# Patient Record
(demographics unavailable — no encounter records)

---

## 2021-01-26 NOTE — EMERGENCY DEPARTMENT REPORT
ED General Adult HPI





- General


Chief complaint: Altered Mental Status


Stated complaint: SYNCOPE/POSS TI


Time Seen by Provider: 01/26/21 14:38


Source: patient, EMS


Mode of arrival: Stretcher


Limitations: Altered Mental Status, Physical Limitation





- History of Present Illness


Initial comments: 





Patient presents to emergency department via EMS with a chief complaint of 

episode of forgetfulness that happened today at home.  Patient states that his 

wife became concerned so she called EMS.  Patient is able to tell me his present

place and time in the last 5 presidents.  Patient denies any chest pain, 

shortness breath, or headache.  Patient states he had a stroke in June 2020 and 

states he was actually on the 9th of June.  Patient states he was told after the

stroke you have episodes of forgetfulness and thinks that is what occurred 

today.  Patient denies any numbness, tingling, paresthesias.


-: Sudden


Severity scale (0 -10): 0


Consistency: now resolved


Improves with: none


Worsens with: none


Associated Symptoms: denies other symptoms


Treatments Prior to Arrival: none





ED Review of Systems


ROS: 


Stated complaint: SYNCOPE/POSS TI


Other details as noted in HPI





Comment: All other systems reviewed and negative


Constitutional: denies: chills, fever


Eyes: denies: eye pain, eye discharge, vision change


ENT: denies: ear pain, throat pain


Respiratory: denies: cough, shortness of breath, wheezing


Cardiovascular: denies: chest pain, palpitations


Endocrine: no symptoms reported


Gastrointestinal: denies: abdominal pain, nausea, diarrhea


Genitourinary: denies: urgency, dysuria


Musculoskeletal: denies: back pain, joint swelling, arthralgia


Skin: denies: rash, lesions


Neurological: denies: headache, weakness, paresthesias


Psychiatric: denies: anxiety, depression


Hematological/Lymphatic: denies: easy bleeding, easy bruising





ED Past Medical Hx





- Past Medical History


Previous Medical History?: Yes


Hx Hypertension: Yes


Hx CVA: Yes





- Social History


Smoking Status: Unknown if ever smoked





ED Physical Exam





- General


Limitations: Altered Mental Status, Physical Limitation


General appearance: alert, in no apparent distress





- Head


Head exam: Present: atraumatic, normocephalic





- Eye


Eye exam: Present: normal appearance, PERRL, EOMI





- ENT


ENT exam: Present: mucous membranes moist





- Neck


Neck exam: Present: normal inspection





- Respiratory


Respiratory exam: Present: normal lung sounds bilaterally.  Absent: respiratory 

distress





- Cardiovascular


Cardiovascular Exam: Present: regular rate, normal rhythm.  Absent: systolic 

murmur, diastolic murmur, rubs, gallop





- GI/Abdominal


GI/Abdominal exam: Present: soft, normal bowel sounds.  Absent: distended, 

tenderness





- Rectal


Rectal exam: Present: deferred





- Extremities Exam


Extremities exam: Present: normal inspection





- Back Exam


Back exam: Present: normal inspection





- Neurological Exam


Neurological exam: Present: alert, oriented X3, CN II-XII intact, other (Patient

has residual right upper stream but he deficit from prior stroke.  Patient also 

has right lower extremity difficulty with movement secondary to previous injury 

with a pin placed).  Absent: motor sensory deficit





- Psychiatric


Psychiatric exam: Present: normal affect, normal mood





- Skin


Skin exam: Present: warm, dry, intact, normal color.  Absent: rash





ED Course





                                   Vital Signs











  01/26/21





  14:14


 


Pulse Rate 76


 


Respiratory 18





Rate 


 


Blood Pressure 186/78


 


Blood Pressure 186/76





[Left] 


 


O2 Sat by Pulse 99





Oximetry 














ED Medical Decision Making





- Lab Data


Result diagrams: 


                                 01/26/21 15:30





                                 01/26/21 15:30








                                   Lab Results











  01/26/21 01/26/21 01/26/21 Range/Units





  15:30 15:30 15:30 


 


WBC  7.3    (4.5-11.0)  K/mm3


 


RBC  4.94    (3.65-5.03)  M/mm3


 


Hgb  11.9    (11.8-15.2)  gm/dl


 


Hct  39.0    (35.5-45.6)  %


 


MCV  79 L    (84-94)  fl


 


MCH  24 L    (28-32)  pg


 


MCHC  31 L    (32-34)  %


 


RDW  17.2 H    (13.2-15.2)  %


 


Plt Count  241    (140-440)  K/mm3


 


Lymph % (Auto)  26.9    (13.4-35.0)  %


 


Mono % (Auto)  9.6 H    (0.0-7.3)  %


 


Eos % (Auto)  1.5    (0.0-4.3)  %


 


Baso % (Auto)  1.0    (0.0-1.8)  %


 


Lymph # (Auto)  2.0    (1.2-5.4)  K/mm3


 


Mono # (Auto)  0.7    (0.0-0.8)  K/mm3


 


Eos # (Auto)  0.1    (0.0-0.4)  K/mm3


 


Baso # (Auto)  0.1    (0.0-0.1)  K/mm3


 


Seg Neutrophils %  61.0    (40.0-70.0)  %


 


Seg Neutrophils #  4.4    (1.8-7.7)  K/mm3


 


PT   13.6   (12.2-14.9)  Sec.


 


INR   1.05   (0.87-1.13)  


 


APTT   31.9   (24.2-36.6)  Sec.


 


Sodium    142  (137-145)  mmol/L


 


Potassium    4.7  (3.6-5.0)  mmol/L


 


Chloride    107.0  ()  mmol/L


 


Carbon Dioxide    28  (22-30)  mmol/L


 


Anion Gap    12  mmol/L


 


BUN    11  (9-20)  mg/dL


 


Creatinine    0.7 L  (0.8-1.3)  mg/dL


 


Estimated GFR    > 60  ml/min


 


BUN/Creatinine Ratio    16  %


 


Glucose    96  ()  mg/dL


 


Lactic Acid     (0.7-2.0)  mmol/L


 


Calcium    9.2  (8.4-10.2)  mg/dL


 


Total Bilirubin    0.30  (0.1-1.2)  mg/dL


 


AST    20  (5-40)  units/L


 


ALT    13  (7-56)  units/L


 


Alkaline Phosphatase    161 H  ()  units/L


 


Ammonia     (25-60)  umol/L


 


Total Protein    7.2  (6.3-8.2)  g/dL


 


Albumin    3.9  (3.9-5)  g/dL


 


Albumin/Globulin Ratio    1.2  %














  01/26/21 01/26/21 Range/Units





  15:30 15:30 


 


WBC    (4.5-11.0)  K/mm3


 


RBC    (3.65-5.03)  M/mm3


 


Hgb    (11.8-15.2)  gm/dl


 


Hct    (35.5-45.6)  %


 


MCV    (84-94)  fl


 


MCH    (28-32)  pg


 


MCHC    (32-34)  %


 


RDW    (13.2-15.2)  %


 


Plt Count    (140-440)  K/mm3


 


Lymph % (Auto)    (13.4-35.0)  %


 


Mono % (Auto)    (0.0-7.3)  %


 


Eos % (Auto)    (0.0-4.3)  %


 


Baso % (Auto)    (0.0-1.8)  %


 


Lymph # (Auto)    (1.2-5.4)  K/mm3


 


Mono # (Auto)    (0.0-0.8)  K/mm3


 


Eos # (Auto)    (0.0-0.4)  K/mm3


 


Baso # (Auto)    (0.0-0.1)  K/mm3


 


Seg Neutrophils %    (40.0-70.0)  %


 


Seg Neutrophils #    (1.8-7.7)  K/mm3


 


PT    (12.2-14.9)  Sec.


 


INR    (0.87-1.13)  


 


APTT    (24.2-36.6)  Sec.


 


Sodium    (137-145)  mmol/L


 


Potassium    (3.6-5.0)  mmol/L


 


Chloride    ()  mmol/L


 


Carbon Dioxide    (22-30)  mmol/L


 


Anion Gap    mmol/L


 


BUN    (9-20)  mg/dL


 


Creatinine    (0.8-1.3)  mg/dL


 


Estimated GFR    ml/min


 


BUN/Creatinine Ratio    %


 


Glucose    ()  mg/dL


 


Lactic Acid  1.40   (0.7-2.0)  mmol/L


 


Calcium    (8.4-10.2)  mg/dL


 


Total Bilirubin    (0.1-1.2)  mg/dL


 


AST    (5-40)  units/L


 


ALT    (7-56)  units/L


 


Alkaline Phosphatase    ()  units/L


 


Ammonia   49.0  (25-60)  umol/L


 


Total Protein    (6.3-8.2)  g/dL


 


Albumin    (3.9-5)  g/dL


 


Albumin/Globulin Ratio    %














- Radiology Data


Radiology results: report reviewed





- Medical Decision Making





Discussed results with patient


Critical care attestation.: 


If time is entered above; I have spent that time in minutes in the direct care 

of this critically ill patient, excluding procedure time.








ED Disposition


Clinical Impression: 


 Forgetfulness





Disposition: DC-01 TO HOME OR SELFCARE


Is pt being admited?: No


Does the pt Need Aspirin: No


Condition: Stable


Instructions:  Confusion


Additional Instructions: 


Return if worse


Referrals: 


NICOLE SANABRIA MD [Primary Care Provider] - 3-5 Days


Time of Disposition: 18:15

## 2021-01-26 NOTE — XRAY REPORT
CHEST 1 VIEW



INDICATION: 

Altered Mental Status.



COMPARISON: 

None.



FINDINGS:



Support devices: None.

Heart: Mildly enlarged. 

Lungs/Pleura: Mild reticular opacities are present, greater on the right. No significant effusion, no
 pneumothorax.  







IMPRESSION:

1. Bilateral pulmonary opacities are somewhat reticular and may be chronic. It would be difficult to 
exclude lower airways disease. No focal consolidation.



Signer Name: Shashi Wolfe MD 

Signed: 1/26/2021 3:38 PM

Workstation Name: [id number]

## 2021-01-26 NOTE — CAT SCAN REPORT
CT CHEST WITHOUT IV CONTRAST



INDICATION:

pneumonia.



COMPARISON:

No prior CTs.



TECHNIQUE:

All CT scans at this location are performed using CT dose reduction for ALARA by means of automated e
xposure control.  Axial CT images were obtained through the chest.



FINDINGS:



Upper Abdomen: No acute abnormality.



Skeletal System: No acute abnormality.



Chest:

Great Vessels: Right sided arch is noted. Descending thoracic aorta is heavily calcified.

Heart: Cardiomegaly. There is moderate to advanced coronary artery calcification.

Mediastinum & Porsha: No adenopathy is seen. The esophagus is mildly patulous.

Lungs: No consolidation is seen. There are mild increased reticular markings in the bases with mild b
ronchiectasis.

Pleura: No significant pleural effusion. No pneumothorax.



Additional Findings: None.







IMPRESSION:

1. No acute pulmonary findings. Is mild scarring and bronchiectasis in the bases.

2. Right-sided aortic arch.

3. Cardiomegaly.



Signer Name: Shashi Wolfe MD 

Signed: 1/26/2021 5:25 PM

Workstation Name: [id number]

## 2021-01-26 NOTE — CAT SCAN REPORT
CT head/brain wo con



INDICATION:

Altered Mental Status.



TECHNIQUE: Routine CT head. All CT scans at this location are performed using CT dose reduction for A
ELDER by means of automated exposure control.



COMPARISON: 

None.



FINDINGS:



Intracranial: Gray-white matter differentiation is maintained. No intracranial hemorrhage. No extra a
xial collection. No hydrocephalus. No herniation. Generalized atrophy. Remote left basal ganglia lacu
toi infarction. Periventricular and centrum semiovale white matter hypoattenuation most consistent wi
th sequela of chronic microvascular disease. Scattered calcifications in the sulci likely from prior 
healed neurocysticercosis.

Sinuses: Paranasal sinuses and mastoid air cells are essentially clear.

Orbits: Globes are intact. 

Calvarium: No acute fracture.





IMPRESSION:

1.  No acute intracranial abnormality.

2. Generalized atrophy and sequela of chronic microvascular disease.



Signer Name: Lai Virk MD 

Signed: 1/26/2021 4:34 PM

Workstation Name: [id number]